# Patient Record
(demographics unavailable — no encounter records)

---

## 2024-11-06 NOTE — RISK ASSESSMENT
[Grade: ____] : Grade: [unfilled] [Has had sexual intercourse] : has had sexual intercourse [With Teen] : teen [Uses tobacco] : does not use tobacco [Uses drugs] : does not use drugs  [Drinks alcohol] : does not drink alcohol [Gets depressed, anxious, or irritable/has mood swings] : does not get depressed, anxious, or irritable/has mood swings [Has thought about hurting self or considered suicide] : has not thought about hurting self or considered suicide [de-identified] : lives with mother & father - feels safe at home  [de-identified] : attends [employer] -  plans to attend college after high school

## 2024-11-06 NOTE — HISTORY OF PRESENT ILLNESS
[de-identified] : emergency contraception  [FreeTextEntry6] : 17-year-old female presenting for emergency contraception.   Last sexual activity: 11/5/24, used a condom for part of the time.   Coitarche: age 17, 1 day ago.  Menarche: age 14-15. Pt reports regular monthly period. Pt typically bleeds for 5-6 days. Pt denies any heavy bleeding.   Pt reports that her weight always goes up and down. Pt reports recent illness for three week. Pt reports that she was mostly on a liquid diet during this time. Pt reports that she had a viral illness - attributes it to the change in temperature. Pt feels well today.   Pt reports that she likes to eat. Pt eats breakfast, after school, and then dinner at home. Pt denies food insecurity. Pt denies restricting, calorie counting, or over-exercising. Goal weight: 100-105 lbs.

## 2024-11-06 NOTE — DISCUSSION/SUMMARY
[FreeTextEntry1] : 17-year-old female presenting for emergency contraception, contraceptive counseling, and underweight status.   1) Emergency Contraception & Contraceptive Counseling  -Negative urine pregnancy test.  -Emergency contraception consent signed and reviewed.  -Dispensed 1 My Way by direct observation and 1 My Way advance.  -Counseled on all methods of contraception. Pt is interested in hormonal IUD. Will work on scheduling appointment for IUD insertion. Provided anticipatory guidance regarding insertion, pain associated with insertion, and side effects including a change in normal menstrual bleeding patter and possibly amenorrhea. -Return to [facility] center in 3 weeks for repeat pregnancy test and for STI & HIV testing (too soon to do testing today as coitarche was 1 day ago).   2) Underweight Status -BMI: 16.14 kg/n2; 1% -No concern for eating disorder or food insecurity at this time.  -Ordered CMP, CBC, and TSH.  -Encouraged pt to increase intake of calorically dense foods - nuts, nut butters, cheese, avocado, muffins, potatoes.  -Will recheck weight in 3 weeks.

## 2024-11-06 NOTE — PHYSICAL EXAM
[NL] : regular rate and rhythm, normal S1, S2 audible, no murmurs [de-identified] : no thyromegaly

## 2024-11-06 NOTE — BEGINNING OF VISIT
[Patient] : patient [[employer] ] : provided by [employer]   [] :  [Time Spent: ____ minutes] : Total time spent using  services: [unfilled] minutes. The patient's primary language is not English thus required  services. [Interpreters_IDNumber] : [id number] [TWNoteComboBox1] : Panamanian